# Patient Record
Sex: FEMALE | Race: WHITE | NOT HISPANIC OR LATINO
[De-identification: names, ages, dates, MRNs, and addresses within clinical notes are randomized per-mention and may not be internally consistent; named-entity substitution may affect disease eponyms.]

---

## 2017-02-22 ENCOUNTER — APPOINTMENT (OUTPATIENT)
Dept: GASTROENTEROLOGY | Facility: CLINIC | Age: 27
End: 2017-02-22

## 2017-07-10 ENCOUNTER — RESULT REVIEW (OUTPATIENT)
Age: 27
End: 2017-07-10

## 2017-07-18 ENCOUNTER — RESULT REVIEW (OUTPATIENT)
Age: 27
End: 2017-07-18

## 2017-10-18 ENCOUNTER — APPOINTMENT (OUTPATIENT)
Dept: OBGYN | Facility: CLINIC | Age: 27
End: 2017-10-18
Payer: COMMERCIAL

## 2017-10-18 VITALS
HEIGHT: 60 IN | BODY MASS INDEX: 20.81 KG/M2 | WEIGHT: 106 LBS | DIASTOLIC BLOOD PRESSURE: 70 MMHG | SYSTOLIC BLOOD PRESSURE: 100 MMHG

## 2017-10-18 PROCEDURE — 99213 OFFICE O/P EST LOW 20 MIN: CPT

## 2018-03-14 ENCOUNTER — RX RENEWAL (OUTPATIENT)
Age: 28
End: 2018-03-14

## 2018-04-11 ENCOUNTER — APPOINTMENT (OUTPATIENT)
Dept: OBGYN | Facility: CLINIC | Age: 28
End: 2018-04-11
Payer: COMMERCIAL

## 2018-04-11 VITALS
HEIGHT: 60 IN | DIASTOLIC BLOOD PRESSURE: 80 MMHG | SYSTOLIC BLOOD PRESSURE: 114 MMHG | WEIGHT: 107 LBS | BODY MASS INDEX: 21.01 KG/M2

## 2018-04-11 DIAGNOSIS — N94.6 DYSMENORRHEA, UNSPECIFIED: ICD-10-CM

## 2018-04-11 PROCEDURE — 99214 OFFICE O/P EST MOD 30 MIN: CPT

## 2018-04-17 ENCOUNTER — APPOINTMENT (OUTPATIENT)
Dept: OBGYN | Facility: CLINIC | Age: 28
End: 2018-04-17

## 2018-04-19 ENCOUNTER — ASOB RESULT (OUTPATIENT)
Age: 28
End: 2018-04-19

## 2018-04-19 ENCOUNTER — APPOINTMENT (OUTPATIENT)
Dept: OBGYN | Facility: CLINIC | Age: 28
End: 2018-04-19
Payer: COMMERCIAL

## 2018-04-19 PROCEDURE — 76830 TRANSVAGINAL US NON-OB: CPT

## 2018-07-24 ENCOUNTER — APPOINTMENT (OUTPATIENT)
Dept: OBGYN | Facility: CLINIC | Age: 28
End: 2018-07-24
Payer: COMMERCIAL

## 2018-07-24 VITALS
DIASTOLIC BLOOD PRESSURE: 73 MMHG | WEIGHT: 104 LBS | BODY MASS INDEX: 20.42 KG/M2 | HEIGHT: 60 IN | SYSTOLIC BLOOD PRESSURE: 109 MMHG

## 2018-07-24 PROCEDURE — 99395 PREV VISIT EST AGE 18-39: CPT

## 2018-07-25 LAB — HPV HIGH+LOW RISK DNA PNL CVX: NOT DETECTED

## 2018-07-31 LAB — CYTOLOGY CVX/VAG DOC THIN PREP: NORMAL

## 2018-08-05 ENCOUNTER — RX RENEWAL (OUTPATIENT)
Age: 28
End: 2018-08-05

## 2018-10-12 ENCOUNTER — RX RENEWAL (OUTPATIENT)
Age: 28
End: 2018-10-12

## 2019-01-03 ENCOUNTER — APPOINTMENT (OUTPATIENT)
Dept: PULMONOLOGY | Facility: CLINIC | Age: 29
End: 2019-01-03
Payer: COMMERCIAL

## 2019-01-03 VITALS
SYSTOLIC BLOOD PRESSURE: 99 MMHG | HEIGHT: 61 IN | DIASTOLIC BLOOD PRESSURE: 69 MMHG | RESPIRATION RATE: 17 BRPM | WEIGHT: 105 LBS | BODY MASS INDEX: 19.83 KG/M2 | OXYGEN SATURATION: 100 % | HEART RATE: 90 BPM

## 2019-01-03 DIAGNOSIS — Z78.9 OTHER SPECIFIED HEALTH STATUS: ICD-10-CM

## 2019-01-03 DIAGNOSIS — R09.1 PLEURISY: ICD-10-CM

## 2019-01-03 DIAGNOSIS — K21.9 GASTRO-ESOPHAGEAL REFLUX DISEASE W/OUT ESOPHAGITIS: ICD-10-CM

## 2019-01-03 DIAGNOSIS — K63.89 OTHER SPECIFIED DISEASES OF INTESTINE: ICD-10-CM

## 2019-01-03 DIAGNOSIS — Z83.79 FAMILY HISTORY OF OTHER DISEASES OF THE DIGESTIVE SYSTEM: ICD-10-CM

## 2019-01-03 DIAGNOSIS — Z72.820 SLEEP DEPRIVATION: ICD-10-CM

## 2019-01-03 DIAGNOSIS — J45.901 ACUTE BRONCHITIS, UNSPECIFIED: ICD-10-CM

## 2019-01-03 DIAGNOSIS — Z82.69 FAMILY HISTORY OF OTHER DISEASES OF THE MUSCULOSKELETAL SYSTEM AND CONNECTIVE TISSUE: ICD-10-CM

## 2019-01-03 DIAGNOSIS — R05 COUGH: ICD-10-CM

## 2019-01-03 DIAGNOSIS — J20.9 ACUTE BRONCHITIS, UNSPECIFIED: ICD-10-CM

## 2019-01-03 PROCEDURE — 71046 X-RAY EXAM CHEST 2 VIEWS: CPT

## 2019-01-03 PROCEDURE — 94729 DIFFUSING CAPACITY: CPT

## 2019-01-03 PROCEDURE — 94727 GAS DIL/WSHOT DETER LNG VOL: CPT

## 2019-01-03 PROCEDURE — 99205 OFFICE O/P NEW HI 60 MIN: CPT | Mod: 25

## 2019-01-03 PROCEDURE — 94060 EVALUATION OF WHEEZING: CPT

## 2019-01-03 RX ORDER — DROSPIRENONE AND ETHINYL ESTRADIOL 0.03MG-3MG
3-0.03 KIT ORAL
Qty: 90 | Refills: 3 | Status: DISCONTINUED | COMMUNITY
Start: 2018-04-11 | End: 2019-01-03

## 2019-01-03 NOTE — ADDENDUM
[FreeTextEntry1] : Documented by Brown Childers acting as a scribe for Dr. Michael Haddad on 1/3/19.\par \par All medical record entries made by the Scribe were at my, Dr. Michael Haddad's, direction and personally dictated by me on 1/3/19. I have reviewed the chart and agree that the record accurately reflects my personal performance of the history, physical exam, procedure, assessment and plan. I have also personally directed, reviewed, and agree with the discharge instructions. \par \par

## 2019-01-03 NOTE — HISTORY OF PRESENT ILLNESS
[FreeTextEntry1] : Ms. Hwang is a 28 year old female presenting to the office today for an initial pulmonary evaluation. Her chief complaint is her cough\par -she states 2 weeks ago she was with waking up with a sore throat\par -she notes she developed laryngitis 8 days ago with dysphagia\par -she reports she had laryngitis a few years ago\par -she states she then developed a severe cough which has been present\par -she notes she has had a constant and choking cough\par -she reports she occasionally has migraines\par -her mother states there was a possible bug within the family on their trip to [place]\par -she notes she then began to vomit as well\par -she states she is severely constipated, with having bowel movements once or twice a week\par -she reports she reported to the ER and was found to have pleurisy at [facility] \par -she notes she has pain on her right side and with dyspnea\par -she states she was given 60 mg of Prednisone and nebulizer treatment in the hospital before being discharged after a few hours\par -she reports she has been experiencing heartburn and reflux as well\par -she notes severe chest tightness as well as palpitations \par -she states her sinuses are clear\par -she reports she does not sleep well\par -she notes she recently gained a few pounds\par -she states she has trouble initiating sleep and also does not sleep enough\par -she reports her senses of vision, smell and taste are good \par -she notes her pleurisy pain has begun to radiate to her back\par -she denies any nausea, vomiting, fever, chills, sweats, diarrhea, snore, other myalgias, other arthralgias

## 2019-01-03 NOTE — PHYSICAL EXAM
[General Appearance - Well Developed] : well developed [Normal Appearance] : normal appearance [Well Groomed] : well groomed [General Appearance - Well Nourished] : well nourished [No Deformities] : no deformities [General Appearance - In No Acute Distress] : no acute distress [Normal Conjunctiva] : the conjunctiva exhibited no abnormalities [Eyelids - No Xanthelasma] : the eyelids demonstrated no xanthelasmas [Normal Oropharynx] : normal oropharynx [Neck Appearance] : the appearance of the neck was normal [Neck Cervical Mass (___cm)] : no neck mass was observed [Jugular Venous Distention Increased] : there was no jugular-venous distention [Thyroid Diffuse Enlargement] : the thyroid was not enlarged [Thyroid Nodule] : there were no palpable thyroid nodules [Heart Rate And Rhythm] : heart rate and rhythm were normal [Heart Sounds] : normal S1 and S2 [Murmurs] : no murmurs present [Respiration, Rhythm And Depth] : normal respiratory rhythm and effort [Exaggerated Use Of Accessory Muscles For Inspiration] : no accessory muscle use [Auscultation Breath Sounds / Voice Sounds] : lungs were clear to auscultation bilaterally [Abdomen Soft] : soft [Abdomen Tenderness] : non-tender [Abdomen Mass (___ Cm)] : no abdominal mass palpated [Abnormal Walk] : normal gait [Gait - Sufficient For Exercise Testing] : the gait was sufficient for exercise testing [Nail Clubbing] : no clubbing of the fingernails [Cyanosis, Localized] : no localized cyanosis [Petechial Hemorrhages (___cm)] : no petechial hemorrhages [Skin Color & Pigmentation] : normal skin color and pigmentation [Skin Turgor] : normal skin turgor [] : no rash [Deep Tendon Reflexes (DTR)] : deep tendon reflexes were 2+ and symmetric [Sensation] : the sensory exam was normal to light touch and pinprick [No Focal Deficits] : no focal deficits [Oriented To Time, Place, And Person] : oriented to person, place, and time [Impaired Insight] : insight and judgment were intact [Affect] : the affect was normal [II] : II [FreeTextEntry1] : abdominal discomfort

## 2019-01-03 NOTE — PROCEDURE
[FreeTextEntry1] : Full PFT revealed mild restrictive dysfunction /  normal flows, with a FEV1 of 1.98 ,which is 71% of predicted, normal lung volumes, and a normal diffusion of 21.3, which is 110% of predicted with a normal flow volume loop\par \par CXR reveals a normal sized heart; no evidence of infiltrate or any fluid- normal chest radiograph

## 2019-01-03 NOTE — ASSESSMENT
[FreeTextEntry1] : Ms. Hwang is a 28 year old female with a history of SIBO, IBS, chronic constipation, PCOS, PFO, ADD GERD who is presenting to the office today for an initial pulmonary evaluation. \par \par Her shortness of breath is multifactorial due to: \par -viral infection causing:\par             -pleurisy \par             -asthmatic type bronchitis / bronchospasm \par \par problem 1: asthmatic type bronchitis / bronchospasm\par - add Prednisone taper, 30mg for 5 days, 20mg for 5 days, 10 mg for 5 days \par Information sheet given to the patient to be reviewed, this medication is never to be used without consulting the prescribing physician. Proper dietary restraint is necessary specifically salt containing foods, if any reaction may occur should be reported. \par \par -add Xopenex 0.63 nebulizer at least BID up to QiD (gargle and spit after use) - treatment given in office today\par \par problem 2: pleurisy\par -add Colchicine 0.6 mg BID\par \par problem 3: chronic cough\par -add Telson Perles 200 mg QD \par \par problem 4: GERD\par -continue Prilosec QD\par -recommended to take organic apple cider vinegar tablets \par -Rule of 2s: avoid eating too much, eating too fast, eating too late, eating too spicy, eating two hours before bed\par -Things to avoid including overeating, spicy foods, tight clothing, eating within three hours of bed, this list is not all inclusive. \par -For treatment of reflux, possible options discussed including diet control, H2 blockers, PPIs, as well as coating motility agents discussed as treatment options. Timing of meals and proximity of last meal to sleep were discussed. If symptoms persist, a formal gastrointestinal evaluation is needed. \par \par problem 5: poor sleep\par -recommended to wear sunglasses when using her cell phone\par Good sleep hygiene was encouraged including avoiding watching television an hour before bed, keeping caffeine at a low, avoiding reading, television, or anything, in bed, no drinking any liquids three hours before bedtime, and only getting into bed when tired and ready for sleep. \par \par problem 6: constipation\par -recommended to use Dr. Olsen's intestinal formula \par \par  **additional 30 minutes spent discussing condition and treatment options with patient**\par \par F/U in 6-8 weeks\par Patient is encouraged to call with any changes, concerns, or questions. \par \par

## 2019-01-03 NOTE — REVIEW OF SYSTEMS
[Negative] : Sleep Disorder [Cough] : cough [Constipation] : constipation [Headache] : headache [Recent Wt Gain (___ Lbs)] : recent [unfilled] ~Ulb weight gain [Dyspnea] : dyspnea [Chest Tightness] : chest tightness [Pleuritic Pain] : pleuritic pain [As Noted in HPI] : as noted in HPI [Palpitations] : palpitations [Heartburn] : heartburn [Reflux] : reflux

## 2019-01-05 ENCOUNTER — MOBILE ON CALL (OUTPATIENT)
Age: 29
End: 2019-01-05

## 2019-01-06 ENCOUNTER — RX CHANGE (OUTPATIENT)
Age: 29
End: 2019-01-06

## 2019-01-28 ENCOUNTER — APPOINTMENT (OUTPATIENT)
Dept: PULMONOLOGY | Facility: CLINIC | Age: 29
End: 2019-01-28

## 2019-02-14 ENCOUNTER — APPOINTMENT (OUTPATIENT)
Dept: PULMONOLOGY | Facility: CLINIC | Age: 29
End: 2019-02-14

## 2019-03-31 ENCOUNTER — RX RENEWAL (OUTPATIENT)
Age: 29
End: 2019-03-31

## 2019-04-01 ENCOUNTER — RESULT REVIEW (OUTPATIENT)
Age: 29
End: 2019-04-01

## 2020-05-12 ENCOUNTER — RESULT REVIEW (OUTPATIENT)
Age: 30
End: 2020-05-12

## 2020-12-31 PROBLEM — K63.89 SMALL INTESTINAL BACTERIAL OVERGROWTH: Status: RESOLVED | Noted: 2019-01-03 | Resolved: 2020-12-31

## 2021-01-14 ENCOUNTER — TRANSCRIPTION ENCOUNTER (OUTPATIENT)
Age: 31
End: 2021-01-14

## 2021-02-21 ENCOUNTER — TRANSCRIPTION ENCOUNTER (OUTPATIENT)
Age: 31
End: 2021-02-21

## 2021-07-06 ENCOUNTER — APPOINTMENT (OUTPATIENT)
Dept: OBGYN | Facility: CLINIC | Age: 31
End: 2021-07-06
Payer: COMMERCIAL

## 2021-07-06 VITALS
WEIGHT: 99 LBS | SYSTOLIC BLOOD PRESSURE: 116 MMHG | BODY MASS INDEX: 19.44 KG/M2 | HEIGHT: 60 IN | DIASTOLIC BLOOD PRESSURE: 80 MMHG

## 2021-07-06 DIAGNOSIS — R10.2 PELVIC AND PERINEAL PAIN: ICD-10-CM

## 2021-07-06 DIAGNOSIS — M62.89 OTHER SPECIFIED DISORDERS OF MUSCLE: ICD-10-CM

## 2021-07-06 PROCEDURE — 81003 URINALYSIS AUTO W/O SCOPE: CPT | Mod: QW

## 2021-07-06 PROCEDURE — 99395 PREV VISIT EST AGE 18-39: CPT

## 2021-07-06 PROCEDURE — 99072 ADDL SUPL MATRL&STAF TM PHE: CPT

## 2021-07-06 RX ORDER — SPIRONOLACTONE 100 MG/1
100 TABLET ORAL DAILY
Qty: 90 | Refills: 3 | Status: DISCONTINUED | COMMUNITY
Start: 2017-10-18 | End: 2021-07-06

## 2021-07-06 RX ORDER — NORGESTREL AND ETHINYL ESTRADIOL 0.3-0.03MG
0.3-3 KIT ORAL
Qty: 84 | Refills: 3 | Status: DISCONTINUED | COMMUNITY
Start: 2018-08-05 | End: 2021-07-06

## 2021-07-06 RX ORDER — NORGESTIMATE AND ETHINYL ESTRADIOL 0.25-0.035
0.25-35 KIT ORAL
Qty: 90 | Refills: 3 | Status: DISCONTINUED | COMMUNITY
Start: 2018-04-11 | End: 2021-07-06

## 2021-07-07 LAB — HPV HIGH+LOW RISK DNA PNL CVX: NOT DETECTED

## 2021-07-09 LAB — CYTOLOGY CVX/VAG DOC THIN PREP: NORMAL

## 2021-07-16 ENCOUNTER — RX RENEWAL (OUTPATIENT)
Age: 31
End: 2021-07-16

## 2022-03-16 ENCOUNTER — APPOINTMENT (OUTPATIENT)
Dept: ORTHOPEDIC SURGERY | Facility: CLINIC | Age: 32
End: 2022-03-16

## 2022-03-23 ENCOUNTER — APPOINTMENT (OUTPATIENT)
Dept: OBGYN | Facility: CLINIC | Age: 32
End: 2022-03-23

## 2022-03-28 ENCOUNTER — APPOINTMENT (OUTPATIENT)
Dept: ORTHOPEDIC SURGERY | Facility: CLINIC | Age: 32
End: 2022-03-28

## 2022-06-13 ENCOUNTER — RESULT REVIEW (OUTPATIENT)
Age: 32
End: 2022-06-13

## 2022-07-06 RX ORDER — KETOROLAC TROMETHAMINE 10 MG/1
10 TABLET, FILM COATED ORAL
Qty: 20 | Refills: 0 | Status: ACTIVE | COMMUNITY
Start: 2022-05-18

## 2022-07-06 RX ORDER — SPIRONOLACTONE 50 MG/1
50 TABLET ORAL DAILY
Qty: 90 | Refills: 3 | Status: DISCONTINUED | COMMUNITY
Start: 2021-07-06 | End: 2022-07-06

## 2022-07-06 RX ORDER — NIRMATRELVIR AND RITONAVIR 300-100 MG
20 X 150 MG & KIT ORAL
Qty: 30 | Refills: 0 | Status: ACTIVE | COMMUNITY
Start: 2022-06-26

## 2022-07-06 RX ORDER — SODIUM SULFATE, MAGNESIUM SULFATE, AND POTASSIUM CHLORIDE 17.75; 2.7; 2.25 G/1; G/1; G/1
1479-225-188 TABLET ORAL
Qty: 24 | Refills: 0 | Status: ACTIVE | COMMUNITY
Start: 2022-06-07

## 2022-07-06 RX ORDER — BENZONATATE 100 MG/1
100 CAPSULE ORAL
Qty: 42 | Refills: 0 | Status: ACTIVE | COMMUNITY
Start: 2022-07-05

## 2022-07-06 RX ORDER — DEXTROAMPHETAMINE SACCHARATE, AMPHETAMINE ASPARTATE, DEXTROAMPHETAMINE SULFATE AND AMPHETAMINE SULFATE 2.5; 2.5; 2.5; 2.5 MG/1; MG/1; MG/1; MG/1
10 TABLET ORAL
Qty: 60 | Refills: 0 | Status: DISCONTINUED | COMMUNITY
Start: 2021-06-22 | End: 2022-07-06

## 2022-07-06 RX ORDER — HYDROCODONE BITARTRATE AND HOMATROPINE METHYLBROMIDE 5; 1.5 MG/5ML; MG/5ML
5-1.5 SYRUP ORAL
Qty: 1 | Refills: 0 | Status: DISCONTINUED | COMMUNITY
Start: 2019-01-05 | End: 2022-07-06

## 2022-07-06 RX ORDER — BENZONATATE 200 MG/1
200 CAPSULE ORAL 3 TIMES DAILY
Qty: 90 | Refills: 1 | Status: DISCONTINUED | COMMUNITY
Start: 2019-01-03 | End: 2022-07-06

## 2022-07-06 RX ORDER — AZITHROMYCIN 250 MG/1
250 TABLET, FILM COATED ORAL
Qty: 6 | Refills: 0 | Status: ACTIVE | COMMUNITY
Start: 2022-05-06

## 2022-07-06 RX ORDER — DEXTROAMPHETAMINE SACCHARATE, AMPHETAMINE ASPARTATE, DEXTROAMPHETAMINE SULFATE AND AMPHETAMINE SULFATE 5; 5; 5; 5 MG/1; MG/1; MG/1; MG/1
20 TABLET ORAL
Qty: 60 | Refills: 0 | Status: ACTIVE | COMMUNITY
Start: 2022-06-03

## 2022-07-07 ENCOUNTER — APPOINTMENT (OUTPATIENT)
Dept: OBGYN | Facility: CLINIC | Age: 32
End: 2022-07-07

## 2022-07-07 VITALS
SYSTOLIC BLOOD PRESSURE: 110 MMHG | HEIGHT: 60 IN | DIASTOLIC BLOOD PRESSURE: 75 MMHG | WEIGHT: 96 LBS | BODY MASS INDEX: 18.85 KG/M2

## 2022-07-07 DIAGNOSIS — Z87.42 PERSONAL HISTORY OF OTHER DISEASES OF THE FEMALE GENITAL TRACT: ICD-10-CM

## 2022-07-07 DIAGNOSIS — E28.2 POLYCYSTIC OVARIAN SYNDROME: ICD-10-CM

## 2022-07-07 DIAGNOSIS — Z01.419 ENCOUNTER FOR GYNECOLOGICAL EXAMINATION (GENERAL) (ROUTINE) W/OUT ABNORMAL FINDINGS: ICD-10-CM

## 2022-07-07 DIAGNOSIS — Z30.41 ENCOUNTER FOR SURVEILLANCE OF CONTRACEPTIVE PILLS: ICD-10-CM

## 2022-07-07 PROCEDURE — 99395 PREV VISIT EST AGE 18-39: CPT

## 2022-07-07 RX ORDER — SPIRONOLACTONE 100 MG/1
100 TABLET ORAL
Qty: 90 | Refills: 3 | Status: ACTIVE | COMMUNITY
Start: 2018-07-24 | End: 1900-01-01

## 2022-07-08 LAB
C TRACH RRNA SPEC QL NAA+PROBE: NOT DETECTED
HPV HIGH+LOW RISK DNA PNL CVX: NOT DETECTED
N GONORRHOEA RRNA SPEC QL NAA+PROBE: NOT DETECTED
SOURCE AMPLIFICATION: NORMAL

## 2022-07-09 LAB
CANDIDA VAG CYTO: NOT DETECTED
G VAGINALIS+PREV SP MTYP VAG QL MICRO: NOT DETECTED
T VAGINALIS VAG QL WET PREP: NOT DETECTED

## 2022-07-10 LAB — BACTERIA GENITAL AEROBE CULT: NORMAL

## 2022-07-11 ENCOUNTER — NON-APPOINTMENT (OUTPATIENT)
Age: 32
End: 2022-07-11

## 2022-07-12 LAB — CYTOLOGY CVX/VAG DOC THIN PREP: NORMAL

## 2022-10-12 ENCOUNTER — APPOINTMENT (OUTPATIENT)
Dept: ALLERGY | Facility: CLINIC | Age: 32
End: 2022-10-12

## 2022-10-12 VITALS
HEART RATE: 107 BPM | WEIGHT: 96 LBS | HEIGHT: 60 IN | DIASTOLIC BLOOD PRESSURE: 62 MMHG | OXYGEN SATURATION: 98 % | BODY MASS INDEX: 18.85 KG/M2 | TEMPERATURE: 98 F | SYSTOLIC BLOOD PRESSURE: 100 MMHG

## 2022-10-12 DIAGNOSIS — B99.9 UNSPECIFIED INFECTIOUS DISEASE: ICD-10-CM

## 2022-10-12 PROCEDURE — 99204 OFFICE O/P NEW MOD 45 MIN: CPT

## 2022-10-12 RX ORDER — HYDROCODONE BITARTRATE AND HOMATROPINE METHYLBROMIDE 5; 1.5 MG/5ML; MG/5ML
5-1.5 SYRUP ORAL
Qty: 50 | Refills: 0 | Status: DISCONTINUED | COMMUNITY
Start: 2019-01-06 | End: 2022-10-12

## 2022-10-12 RX ORDER — SPIRONOLACTONE 50 MG/1
50 TABLET ORAL
Qty: 3 | Refills: 1 | Status: DISCONTINUED | COMMUNITY
Start: 2017-10-18 | End: 2022-10-12

## 2022-10-12 RX ORDER — TOBRAMYCIN 3 MG/ML
0.3 SOLUTION/ DROPS OPHTHALMIC
Qty: 5 | Refills: 0 | Status: DISCONTINUED | COMMUNITY
Start: 2021-04-30 | End: 2022-10-12

## 2022-10-12 RX ORDER — IBUPROFEN 800 MG/1
800 TABLET, FILM COATED ORAL EVERY 8 HOURS
Qty: 30 | Refills: 0 | Status: DISCONTINUED | COMMUNITY
Start: 2019-01-05 | End: 2022-10-12

## 2022-10-12 RX ORDER — LEVALBUTEROL HYDROCHLORIDE 0.63 MG/3ML
0.63 SOLUTION RESPIRATORY (INHALATION)
Qty: 120 | Refills: 3 | Status: DISCONTINUED | COMMUNITY
Start: 2019-01-03 | End: 2022-10-12

## 2022-10-12 RX ORDER — FLUCONAZOLE 150 MG/1
150 TABLET ORAL
Qty: 2 | Refills: 0 | Status: DISCONTINUED | COMMUNITY
Start: 2022-06-03 | End: 2022-10-12

## 2022-10-12 RX ORDER — ACETAMINOPHEN AND CODEINE 300; 30 MG/1; MG/1
300-30 TABLET ORAL
Qty: 20 | Refills: 0 | Status: DISCONTINUED | COMMUNITY
Start: 2022-05-18 | End: 2022-10-12

## 2022-10-12 RX ORDER — COLCHICINE 0.6 MG/1
0.6 TABLET ORAL
Qty: 180 | Refills: 0 | Status: DISCONTINUED | COMMUNITY
Start: 2019-01-03 | End: 2022-10-12

## 2022-10-12 RX ORDER — NORGESTREL AND ETHINYL ESTRADIOL 0.3-0.03MG
0.3-3 KIT ORAL DAILY
Qty: 3 | Refills: 0 | Status: DISCONTINUED | COMMUNITY
Start: 2022-01-24 | End: 2022-10-12

## 2022-10-12 RX ORDER — PREDNISONE 10 MG/1
10 TABLET ORAL
Qty: 50 | Refills: 0 | Status: DISCONTINUED | COMMUNITY
Start: 2019-01-03 | End: 2022-10-12

## 2022-10-25 NOTE — HISTORY OF PRESENT ILLNESS
[de-identified] : Patient followed for CIBO - endometriosis - patient reports chronic fatigue - weak - poor sleeping - she will take Klonopin at bedtime in order to fall asleep - she awakens at nighttime - she does not feel like she has a full night sleep.   When she gets a respiratory or GI infection her symptoms will last longer than friends.    She will usually have coughing after URI - she only occasionally require antibiotics.  She had pleurisy after one of these infections.   She had a CXR - no pneumonia - treated with oral steroids - she than developed costochondritis\par \par She has seen rheumatologist and negative work up for SLE about 4 - 5 years ago.   CT of abdomen - calcification of right adrenal gland.   She has not seen endocrinologist. \par \par COVID in June - two weeks of fever - body aches - HA - coughing - nasal congestion - treated with Paxlovid - vomiting and stopped medications.

## 2022-10-25 NOTE — CONSULT LETTER
[Dear  ___] : Dear  [unfilled], [Thank you for referring [unfilled] for consultation for _____] : Thank you for referring [unfilled] for consultation for [unfilled] [Please see my note below.] : Please see my note below. [Sincerely,] : Sincerely, [FreeTextEntry3] : Mitchell B. Boxer, M.D., FAAAAI\par [facility]\par \par Department of Allergy-Immunology\par [facility] \par [address] [place] [address]\par [place] [address]\par Tel:   [phone]\par Fax:  [phone]\par Email: [email or web address]\par

## 2022-10-25 NOTE — REVIEW OF SYSTEMS
[Fatigue] : fatigue [Headache] : headache [Dizziness] : dizziness [Nl] : Genitourinary [FreeTextEntry5] : PFO  [de-identified] : she has not seen endocrinology

## 2022-10-25 NOTE — PHYSICAL EXAM
[Alert] : alert [Well Nourished] : well nourished [Healthy Appearance] : healthy appearance [No Acute Distress] : no acute distress [Well Developed] : well developed [Normal Voice/Communication] : normal voice communication [No Neck Mass] : no neck mass was observed [No LAD] : no lymphadenopathy [No Thyroid Mass] : no thyroid mass [Supple] : the neck was supple [Normal Rate and Effort] : normal respiratory rhythm and effort [No Crackles] : no crackles [No Retractions] : no retractions [Normal Rate] : heart rate was normal  [Normal S1, S2] : normal S1 and S2 [No murmur] : no murmur [Regular Rhythm] : with a regular rhythm [Normal Cervical Lymph Nodes] : cervical [Skin Intact] : skin intact  [No Rash] : no rash [Normal Mood] : mood was normal [Normal Affect] : affect was normal [Judgment and Insight Age Appropriate] : judgement and insight is age appropriate [Alert, Awake, Oriented as Age-Appropriate] : alert, awake, oriented as age appropriate [Wheezing] : no wheezing was heard

## 2022-10-25 NOTE — ASSESSMENT
[FreeTextEntry1] : Multiple systemic symptoms - extreme fatigue - poor sleep - HA - recurrent respiratory infections - viral infections lasting longer than usual - unlikely underlying immune disorder - possible endocrine etiology\par \par Immune work - QUIGs, IgG subclasses,  DT, pneumococcal antibodies, HIV, lymphocyte subset panel \par Endocrine work up - AM Cortisol level

## 2022-10-25 NOTE — SOCIAL HISTORY
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

## 2023-07-12 ENCOUNTER — APPOINTMENT (OUTPATIENT)
Dept: OBGYN | Facility: CLINIC | Age: 33
End: 2023-07-12

## 2023-08-22 ENCOUNTER — APPOINTMENT (OUTPATIENT)
Dept: OBGYN | Facility: CLINIC | Age: 33
End: 2023-08-22

## 2023-09-18 RX ORDER — NORGESTREL AND ETHINYL ESTRADIOL 0.3-0.03MG
0.3-3 KIT ORAL DAILY
Qty: 84 | Refills: 0 | Status: ACTIVE | COMMUNITY
Start: 2017-10-18 | End: 1900-01-01

## 2023-10-02 ENCOUNTER — APPOINTMENT (OUTPATIENT)
Dept: OBGYN | Facility: CLINIC | Age: 33
End: 2023-10-02
Payer: COMMERCIAL

## 2023-10-02 VITALS
DIASTOLIC BLOOD PRESSURE: 60 MMHG | WEIGHT: 94 LBS | HEIGHT: 60 IN | SYSTOLIC BLOOD PRESSURE: 100 MMHG | BODY MASS INDEX: 18.46 KG/M2

## 2023-10-02 DIAGNOSIS — Z01.419 ENCOUNTER FOR GYNECOLOGICAL EXAMINATION (GENERAL) (ROUTINE) W/OUT ABNORMAL FINDINGS: ICD-10-CM

## 2023-10-02 PROCEDURE — 99395 PREV VISIT EST AGE 18-39: CPT

## 2023-10-02 PROCEDURE — 99213 OFFICE O/P EST LOW 20 MIN: CPT | Mod: 25

## 2023-10-02 RX ORDER — SPIRONOLACTONE 100 MG/1
100 TABLET ORAL DAILY
Qty: 3 | Refills: 3 | Status: ACTIVE | COMMUNITY
Start: 2023-10-02 | End: 1900-01-01

## 2023-10-02 RX ORDER — NORGESTREL AND ETHINYL ESTRADIOL 0.3-0.03MG
0.3-3 KIT ORAL DAILY
Qty: 3 | Refills: 3 | Status: ACTIVE | COMMUNITY
Start: 2023-10-02 | End: 1900-01-01

## 2023-10-03 LAB — HPV HIGH+LOW RISK DNA PNL CVX: NOT DETECTED

## 2023-10-05 LAB — CYTOLOGY CVX/VAG DOC THIN PREP: NORMAL

## 2024-10-07 ENCOUNTER — NON-APPOINTMENT (OUTPATIENT)
Age: 34
End: 2024-10-07

## 2024-10-07 ENCOUNTER — APPOINTMENT (OUTPATIENT)
Dept: OBGYN | Facility: CLINIC | Age: 34
End: 2024-10-07
Payer: COMMERCIAL

## 2024-10-07 VITALS
HEIGHT: 60 IN | BODY MASS INDEX: 18.85 KG/M2 | WEIGHT: 96 LBS | SYSTOLIC BLOOD PRESSURE: 113 MMHG | DIASTOLIC BLOOD PRESSURE: 75 MMHG

## 2024-10-07 DIAGNOSIS — Z87.42 PERSONAL HISTORY OF OTHER DISEASES OF THE FEMALE GENITAL TRACT: ICD-10-CM

## 2024-10-07 DIAGNOSIS — G89.29 PELVIC AND PERINEAL PAIN: ICD-10-CM

## 2024-10-07 DIAGNOSIS — Z30.41 ENCOUNTER FOR SURVEILLANCE OF CONTRACEPTIVE PILLS: ICD-10-CM

## 2024-10-07 DIAGNOSIS — R10.2 PELVIC AND PERINEAL PAIN: ICD-10-CM

## 2024-10-07 DIAGNOSIS — M62.89 OTHER SPECIFIED DISORDERS OF MUSCLE: ICD-10-CM

## 2024-10-07 DIAGNOSIS — E28.2 POLYCYSTIC OVARIAN SYNDROME: ICD-10-CM

## 2024-10-07 DIAGNOSIS — Z01.411 ENCOUNTER FOR GYNECOLOGICAL EXAMINATION (GENERAL) (ROUTINE) WITH ABNORMAL FINDINGS: ICD-10-CM

## 2024-10-07 PROCEDURE — 99395 PREV VISIT EST AGE 18-39: CPT

## 2024-10-07 PROCEDURE — 99459 PELVIC EXAMINATION: CPT

## 2024-10-07 PROCEDURE — 99213 OFFICE O/P EST LOW 20 MIN: CPT | Mod: 25

## 2024-10-08 LAB — HPV HIGH+LOW RISK DNA PNL CVX: NOT DETECTED

## 2024-10-13 LAB — CYTOLOGY CVX/VAG DOC THIN PREP: NORMAL

## 2024-10-23 ENCOUNTER — OUTPATIENT (OUTPATIENT)
Dept: OUTPATIENT SERVICES | Facility: HOSPITAL | Age: 34
LOS: 1 days | End: 2024-10-23
Payer: COMMERCIAL

## 2024-10-23 ENCOUNTER — APPOINTMENT (OUTPATIENT)
Dept: ULTRASOUND IMAGING | Facility: CLINIC | Age: 34
End: 2024-10-23
Payer: COMMERCIAL

## 2024-10-23 DIAGNOSIS — R10.2 PELVIC AND PERINEAL PAIN: ICD-10-CM

## 2024-10-23 DIAGNOSIS — Z87.42 PERSONAL HISTORY OF OTHER DISEASES OF THE FEMALE GENITAL TRACT: ICD-10-CM

## 2024-10-23 PROCEDURE — 76830 TRANSVAGINAL US NON-OB: CPT

## 2024-10-23 PROCEDURE — 76830 TRANSVAGINAL US NON-OB: CPT | Mod: 26

## 2024-10-28 ENCOUNTER — TRANSCRIPTION ENCOUNTER (OUTPATIENT)
Age: 34
End: 2024-10-28

## 2025-06-30 ENCOUNTER — RX RENEWAL (OUTPATIENT)
Age: 35
End: 2025-06-30